# Patient Record
Sex: MALE | Race: WHITE | NOT HISPANIC OR LATINO | Employment: FULL TIME | URBAN - METROPOLITAN AREA
[De-identification: names, ages, dates, MRNs, and addresses within clinical notes are randomized per-mention and may not be internally consistent; named-entity substitution may affect disease eponyms.]

---

## 2024-04-15 ENCOUNTER — TELEPHONE (OUTPATIENT)
Dept: PRIMARY CARE | Facility: CLINIC | Age: 34
End: 2024-04-15
Payer: COMMERCIAL

## 2024-04-15 NOTE — TELEPHONE ENCOUNTER
Benjamin called to see if Dr CURRY would accept him as a new patient. His parents are both patients of Dr CURRY. [phone]

## 2024-04-24 ENCOUNTER — OFFICE VISIT (OUTPATIENT)
Dept: PRIMARY CARE | Facility: CLINIC | Age: 34
End: 2024-04-24
Payer: COMMERCIAL

## 2024-04-24 VITALS
TEMPERATURE: 97.9 F | SYSTOLIC BLOOD PRESSURE: 122 MMHG | WEIGHT: 275 LBS | DIASTOLIC BLOOD PRESSURE: 84 MMHG | HEIGHT: 74 IN | OXYGEN SATURATION: 98 % | BODY MASS INDEX: 35.29 KG/M2 | HEART RATE: 87 BPM

## 2024-04-24 DIAGNOSIS — K40.30 NON-RECURRENT UNILATERAL INGUINAL HERNIA WITH OBSTRUCTION WITHOUT GANGRENE: Primary | ICD-10-CM

## 2024-04-24 PROCEDURE — 99203 OFFICE O/P NEW LOW 30 MIN: CPT | Performed by: FAMILY MEDICINE

## 2024-04-24 PROCEDURE — 1036F TOBACCO NON-USER: CPT | Performed by: FAMILY MEDICINE

## 2024-04-24 ASSESSMENT — PATIENT HEALTH QUESTIONNAIRE - PHQ9
1. LITTLE INTEREST OR PLEASURE IN DOING THINGS: NOT AT ALL
SUM OF ALL RESPONSES TO PHQ9 QUESTIONS 1 AND 2: 0
2. FEELING DOWN, DEPRESSED OR HOPELESS: NOT AT ALL

## 2024-04-24 ASSESSMENT — ENCOUNTER SYMPTOMS
DEPRESSION: 0
OCCASIONAL FEELINGS OF UNSTEADINESS: 0
LOSS OF SENSATION IN FEET: 0

## 2024-04-24 ASSESSMENT — COLUMBIA-SUICIDE SEVERITY RATING SCALE - C-SSRS
6. HAVE YOU EVER DONE ANYTHING, STARTED TO DO ANYTHING, OR PREPARED TO DO ANYTHING TO END YOUR LIFE?: NO
2. HAVE YOU ACTUALLY HAD ANY THOUGHTS OF KILLING YOURSELF?: NO
1. IN THE PAST MONTH, HAVE YOU WISHED YOU WERE DEAD OR WISHED YOU COULD GO TO SLEEP AND NOT WAKE UP?: NO

## 2024-04-24 ASSESSMENT — PAIN SCALES - GENERAL: PAINLEVEL: 7

## 2024-04-24 ASSESSMENT — LIFESTYLE VARIABLES
SKIP TO QUESTIONS 9-10: 1
AUDIT-C TOTAL SCORE: 0
HOW OFTEN DO YOU HAVE A DRINK CONTAINING ALCOHOL: NEVER
HOW OFTEN DO YOU HAVE SIX OR MORE DRINKS ON ONE OCCASION: NEVER
HOW MANY STANDARD DRINKS CONTAINING ALCOHOL DO YOU HAVE ON A TYPICAL DAY: PATIENT DOES NOT DRINK

## 2024-04-24 NOTE — PROGRESS NOTES
"Subjective   Patient ID: Benjamin Mosqueda is a 33 y.o. male who presents for Establish Care and Groin Pain.    Groin Pain     2 mo r sided groin pain with lump.  When he lays down the lump seems to go away itself and uses the restroom scrotal sac and the lump enlarges again  Denies any bowel or bladder obstruction issues        Review of Systems    Objective   /84   Pulse 87   Temp 36.6 °C (97.9 °F) (Temporal)   Ht 1.88 m (6' 2\")   Wt 125 kg (275 lb)   SpO2 98%   BMI 35.31 kg/m²     Physical Exam  Vitals reviewed.   Constitutional:       Appearance: Normal appearance.   Cardiovascular:      Rate and Rhythm: Normal rate and regular rhythm.      Heart sounds: Normal heart sounds. No murmur heard.  Pulmonary:      Breath sounds: Normal breath sounds.   Abdominal:      General: Abdomen is flat. Bowel sounds are normal.      Palpations: Abdomen is soft.      Hernia: A hernia is present. Hernia is present in the right inguinal area (large).   Genitourinary:     Testes:         Right: Tenderness present. Mass or swelling not present. Right testis is descended.   Musculoskeletal:         General: No swelling.   Neurological:      Mental Status: He is alert.         Assessment/Plan   Diagnoses and all orders for this visit:  Non-recurrent unilateral inguinal hernia with obstruction without gangrene  -     Referral to General Surgery; Future      Counseled on signs and symptoms strangulation take your he is to go directly to the emergency department and/or if pain increases  Refer to general surgery       "

## 2024-04-26 ENCOUNTER — OFFICE VISIT (OUTPATIENT)
Dept: SURGERY | Facility: CLINIC | Age: 34
End: 2024-04-26
Payer: COMMERCIAL

## 2024-04-26 ENCOUNTER — LAB (OUTPATIENT)
Dept: LAB | Facility: LAB | Age: 34
End: 2024-04-26
Payer: COMMERCIAL

## 2024-04-26 VITALS
HEART RATE: 85 BPM | SYSTOLIC BLOOD PRESSURE: 144 MMHG | BODY MASS INDEX: 31.95 KG/M2 | OXYGEN SATURATION: 95 % | WEIGHT: 249 LBS | HEIGHT: 74 IN | DIASTOLIC BLOOD PRESSURE: 91 MMHG

## 2024-04-26 DIAGNOSIS — K40.90 NON-RECURRENT UNILATERAL INGUINAL HERNIA WITHOUT OBSTRUCTION OR GANGRENE: ICD-10-CM

## 2024-04-26 DIAGNOSIS — R10.31 RIGHT INGUINAL PAIN: ICD-10-CM

## 2024-04-26 DIAGNOSIS — K40.30 NON-RECURRENT UNILATERAL INGUINAL HERNIA WITH OBSTRUCTION WITHOUT GANGRENE: ICD-10-CM

## 2024-04-26 LAB
ABO GROUP (TYPE) IN BLOOD: NORMAL
ALBUMIN SERPL BCP-MCNC: 4.7 G/DL (ref 3.4–5)
ALP SERPL-CCNC: 80 U/L (ref 33–120)
ALT SERPL W P-5'-P-CCNC: 26 U/L (ref 10–52)
ANION GAP SERPL CALC-SCNC: 18 MMOL/L (ref 10–20)
ANTIBODY SCREEN: NORMAL
AST SERPL W P-5'-P-CCNC: 21 U/L (ref 9–39)
BILIRUB DIRECT SERPL-MCNC: 0.1 MG/DL (ref 0–0.3)
BILIRUB SERPL-MCNC: 0.6 MG/DL (ref 0–1.2)
BUN SERPL-MCNC: 14 MG/DL (ref 6–23)
CALCIUM SERPL-MCNC: 9.4 MG/DL (ref 8.6–10.3)
CHLORIDE SERPL-SCNC: 102 MMOL/L (ref 98–107)
CO2 SERPL-SCNC: 26 MMOL/L (ref 21–32)
CREAT SERPL-MCNC: 0.87 MG/DL (ref 0.5–1.3)
EGFRCR SERPLBLD CKD-EPI 2021: >90 ML/MIN/1.73M*2
ERYTHROCYTE [DISTWIDTH] IN BLOOD BY AUTOMATED COUNT: 12.6 % (ref 11.5–14.5)
GLUCOSE SERPL-MCNC: 85 MG/DL (ref 74–99)
HCT VFR BLD AUTO: 48.1 % (ref 41–52)
HGB BLD-MCNC: 15.4 G/DL (ref 13.5–17.5)
INR PPP: 1 (ref 0.9–1.1)
MAGNESIUM SERPL-MCNC: 2.06 MG/DL (ref 1.6–2.4)
MCH RBC QN AUTO: 29.7 PG (ref 26–34)
MCHC RBC AUTO-ENTMCNC: 32 G/DL (ref 32–36)
MCV RBC AUTO: 93 FL (ref 80–100)
NRBC BLD-RTO: 0 /100 WBCS (ref 0–0)
PHOSPHATE SERPL-MCNC: 2.8 MG/DL (ref 2.5–4.9)
PLATELET # BLD AUTO: 269 X10*3/UL (ref 150–450)
POTASSIUM SERPL-SCNC: 3.8 MMOL/L (ref 3.5–5.3)
PROT SERPL-MCNC: 7.3 G/DL (ref 6.4–8.2)
PROTHROMBIN TIME: 11.1 SECONDS (ref 9.8–12.8)
RBC # BLD AUTO: 5.19 X10*6/UL (ref 4.5–5.9)
RH FACTOR (ANTIGEN D): NORMAL
SODIUM SERPL-SCNC: 142 MMOL/L (ref 136–145)
WBC # BLD AUTO: 5.6 X10*3/UL (ref 4.4–11.3)

## 2024-04-26 PROCEDURE — 86850 RBC ANTIBODY SCREEN: CPT

## 2024-04-26 PROCEDURE — 85027 COMPLETE CBC AUTOMATED: CPT

## 2024-04-26 PROCEDURE — 82248 BILIRUBIN DIRECT: CPT

## 2024-04-26 PROCEDURE — 99204 OFFICE O/P NEW MOD 45 MIN: CPT | Performed by: SURGERY

## 2024-04-26 PROCEDURE — 84100 ASSAY OF PHOSPHORUS: CPT

## 2024-04-26 PROCEDURE — 86901 BLOOD TYPING SEROLOGIC RH(D): CPT

## 2024-04-26 PROCEDURE — 83735 ASSAY OF MAGNESIUM: CPT

## 2024-04-26 PROCEDURE — 36415 COLL VENOUS BLD VENIPUNCTURE: CPT

## 2024-04-26 PROCEDURE — 80053 COMPREHEN METABOLIC PANEL: CPT

## 2024-04-26 PROCEDURE — 85610 PROTHROMBIN TIME: CPT

## 2024-04-26 PROCEDURE — 86900 BLOOD TYPING SEROLOGIC ABO: CPT

## 2024-04-26 NOTE — H&P (VIEW-ONLY)
General Surgery History and Physical    Referring Provider:  MD Jessica Diaz, Alan MO MD     Chief Complaint:  Right inguinal hernia    History of Present Illness:  This is a 33 y.o. male who presents with a few month history of a right inguinal hernia.  He reports that a few months ago he felt a gurgling sensation above his pubic bone.  About a month after that he noticed a bulge in his groin.  Over time, the bulge has increased and has extended down his right scrotum.  He denies any issues on the left side.  He denies any fevers, chills, nausea, or vomiting.    Past Medical History:  Patient denies any    Past Surgical History:  Patient denies any    Medications:  Patient denies any    Allergies:  Demerol    Family History:  Patient denies any known family history    Social History:  Single.  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  Denies tobacco, alcohol, and illicits.       Review of Systems:  A complete 12 point review of systems was performed and is negative except as noted in the history of present illness.      Vital Signs:  There were no vitals filed for this visit.       Physical Exam:  General: No acute distress. Sitting up in bed.   Neuro: Alert and oriented ×3. Follows commands.  Head: Atraumatic  Eyes: Pupils equal reactive to light. Extraocular motions intact.  Ears: Hears normal speaking voice.  Mouth, Nose, Throat: Mucous membranes moist.  Normal dentition.  Neck: Supple. No appreciable masses.  Chest: No crepitus.  No appreciable scars.  Heart: Regular rate and rhythm.  Lung: Clear to auscultation bilaterally.  Vascular: No carotid bruits.  Palpable radial pulses bilaterally.  Abdomen: Soft. Nondistended. Nontender.  Right scrotal hernia.  No obvious left inguinal hernia.  Musculoskeletal: Moves all extremities.  Normal range of motion.  Lymphatic: No palpable lymph nodes.  Skin: No rashes or lesions.  Psychological: Normal affect      Laboratory Values:  None      Imaging:     None      Assessment:  This is a 33 y.o. male who presents with a symptomatic right inguinal/scrotal hernia.  We discussed that since it is symptomatic, I recommend a laparoscopic right inguinal hernia repair.  Fortunately, I do not appreciate anything on the left side.      Plan:  -- We will plan for a laparoscopic right inguinal hernia repair with mesh.  -- We will plan for surgery to be performed as an outpatient.  -- We will obtain preoperative labwork including CBC, RFP, Magnesium, LFTs, INR, and Type and Screen.  -- We will apply Dermabond, so the patient may shower the day after surgery.  No swimming or tub soaks for 2 weeks post-operatively.  -- No heavy lifting for 1 week after surgery.        Roverto Garcia MD  General Surgery  Office: [phone]  Fax:     [phone]  8:30 AM   04/26/24

## 2024-04-26 NOTE — PROGRESS NOTES
General Surgery History and Physical    Referring Provider:  MD Jessica Diaz, Alan MO MD     Chief Complaint:  Right inguinal hernia    History of Present Illness:  This is a 33 y.o. male who presents with a few month history of a right inguinal hernia.  He reports that a few months ago he felt a gurgling sensation above his pubic bone.  About a month after that he noticed a bulge in his groin.  Over time, the bulge has increased and has extended down his right scrotum.  He denies any issues on the left side.  He denies any fevers, chills, nausea, or vomiting.    Past Medical History:  Patient denies any    Past Surgical History:  Patient denies any    Medications:  Patient denies any    Allergies:  Demerol    Family History:  Patient denies any known family history    Social History:  Single.  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  Denies tobacco, alcohol, and illicits.       Review of Systems:  A complete 12 point review of systems was performed and is negative except as noted in the history of present illness.      Vital Signs:  There were no vitals filed for this visit.       Physical Exam:  General: No acute distress. Sitting up in bed.   Neuro: Alert and oriented ×3. Follows commands.  Head: Atraumatic  Eyes: Pupils equal reactive to light. Extraocular motions intact.  Ears: Hears normal speaking voice.  Mouth, Nose, Throat: Mucous membranes moist.  Normal dentition.  Neck: Supple. No appreciable masses.  Chest: No crepitus.  No appreciable scars.  Heart: Regular rate and rhythm.  Lung: Clear to auscultation bilaterally.  Vascular: No carotid bruits.  Palpable radial pulses bilaterally.  Abdomen: Soft. Nondistended. Nontender.  Right scrotal hernia.  No obvious left inguinal hernia.  Musculoskeletal: Moves all extremities.  Normal range of motion.  Lymphatic: No palpable lymph nodes.  Skin: No rashes or lesions.  Psychological: Normal affect      Laboratory Values:  None      Imaging:     None      Assessment:  This is a 33 y.o. male who presents with a symptomatic right inguinal/scrotal hernia.  We discussed that since it is symptomatic, I recommend a laparoscopic right inguinal hernia repair.  Fortunately, I do not appreciate anything on the left side.      Plan:  -- We will plan for a laparoscopic right inguinal hernia repair with mesh.  -- We will plan for surgery to be performed as an outpatient.  -- We will obtain preoperative labwork including CBC, RFP, Magnesium, LFTs, INR, and Type and Screen.  -- We will apply Dermabond, so the patient may shower the day after surgery.  No swimming or tub soaks for 2 weeks post-operatively.  -- No heavy lifting for 1 week after surgery.        Roverto Garcia MD  General Surgery  Office: [phone]  Fax:     [phone]  8:30 AM   04/26/24

## 2024-05-15 ENCOUNTER — ANESTHESIA EVENT (OUTPATIENT)
Dept: OPERATING ROOM | Facility: HOSPITAL | Age: 34
End: 2024-05-15
Payer: COMMERCIAL

## 2024-05-15 ENCOUNTER — TELEPHONE (OUTPATIENT)
Dept: PREADMISSION TESTING | Facility: HOSPITAL | Age: 34
End: 2024-05-15
Payer: COMMERCIAL

## 2024-05-15 NOTE — TELEPHONE ENCOUNTER
SURGERY PRE-OPERATIVE INSTRUCTIONS    *You will receive a phone call the day before your procedure  after 2pm, (or the Friday before your surgery if scheduled on a Monday.) Generally the hospital will be calling you with this information after that time.    *You are not to eat after midnight the night before the surgery. You may have 8oz of a clear liquid up until 2 hours prior to arriving to the hospital. The exception is with medications you were instructed to take day of surgery.    *You may take tylenol for pain/discomfort as needed.     *Stop taking all aspirin products, ibuprofen (motrin/advil), naproxen (aleve/naprosyn) for one week prior to surgery.    *Stop taking all vitamins and supplements one week prior to surgery.     *You should not have alcoholic beverages for 24 hours before surgery.     *You should not smoke 24 hours prior to surgery.     *To help prevent surgical infections bathe/shower with Dial soap the evening before surgery.    *You can wear deodorant but no lotion, powder, or perfume/cologne. You should remove all make-up and nail polish at home.    *If you wear glasses, please bring a case for the glasses with you.    *You will be asked to remove dentures and contacts.     *Please leave all valuables at home.    *You should wear loose, comfortable clothing that will accommodate bandages and/or casts.    *You should notify your doctor of any change in your condition (fever, cold, rash, etc). Surgery may need to be re-scheduled until a time you are in better health.    *A responsible adult is required to accompany you to and from the hospital if you are receiving anesthesia or a sedative. Patients are not permitted to drive for 24 hours after anesthesia.     *You can use the [address] parking if you wish.     *If you have any further questions please call [phone].

## 2024-05-17 ENCOUNTER — ANESTHESIA (OUTPATIENT)
Dept: OPERATING ROOM | Facility: HOSPITAL | Age: 34
End: 2024-05-17
Payer: COMMERCIAL

## 2024-05-17 ENCOUNTER — PHARMACY VISIT (OUTPATIENT)
Dept: PHARMACY | Facility: CLINIC | Age: 34
End: 2024-05-17
Payer: COMMERCIAL

## 2024-05-17 ENCOUNTER — HOSPITAL ENCOUNTER (OUTPATIENT)
Facility: HOSPITAL | Age: 34
Setting detail: OUTPATIENT SURGERY
Discharge: HOME | End: 2024-05-17
Attending: SURGERY | Admitting: SURGERY
Payer: COMMERCIAL

## 2024-05-17 VITALS
OXYGEN SATURATION: 97 % | SYSTOLIC BLOOD PRESSURE: 125 MMHG | WEIGHT: 242.51 LBS | HEIGHT: 74 IN | BODY MASS INDEX: 31.12 KG/M2 | DIASTOLIC BLOOD PRESSURE: 69 MMHG | HEART RATE: 77 BPM | RESPIRATION RATE: 15 BRPM | TEMPERATURE: 98.1 F

## 2024-05-17 DIAGNOSIS — K40.90 NON-RECURRENT UNILATERAL INGUINAL HERNIA WITHOUT OBSTRUCTION OR GANGRENE: Primary | ICD-10-CM

## 2024-05-17 LAB
ABO GROUP (TYPE) IN BLOOD: NORMAL
RH FACTOR (ANTIGEN D): NORMAL

## 2024-05-17 PROCEDURE — 36415 COLL VENOUS BLD VENIPUNCTURE: CPT | Performed by: SURGERY

## 2024-05-17 PROCEDURE — 2500000004 HC RX 250 GENERAL PHARMACY W/ HCPCS (ALT 636 FOR OP/ED): Performed by: ANESTHESIOLOGY

## 2024-05-17 PROCEDURE — 3600000003 HC OR TIME - INITIAL BASE CHARGE - PROCEDURE LEVEL THREE: Performed by: SURGERY

## 2024-05-17 PROCEDURE — 3600000008 HC OR TIME - EACH INCREMENTAL 1 MINUTE - PROCEDURE LEVEL THREE: Performed by: SURGERY

## 2024-05-17 PROCEDURE — RXMED WILLOW AMBULATORY MEDICATION CHARGE

## 2024-05-17 PROCEDURE — 7100000009 HC PHASE TWO TIME - INITIAL BASE CHARGE: Performed by: SURGERY

## 2024-05-17 PROCEDURE — 7100000001 HC RECOVERY ROOM TIME - INITIAL BASE CHARGE: Performed by: SURGERY

## 2024-05-17 PROCEDURE — 7100000010 HC PHASE TWO TIME - EACH INCREMENTAL 1 MINUTE: Performed by: SURGERY

## 2024-05-17 PROCEDURE — C1781 MESH (IMPLANTABLE): HCPCS | Performed by: SURGERY

## 2024-05-17 PROCEDURE — 7100000002 HC RECOVERY ROOM TIME - EACH INCREMENTAL 1 MINUTE: Performed by: SURGERY

## 2024-05-17 PROCEDURE — 2500000001 HC RX 250 WO HCPCS SELF ADMINISTERED DRUGS (ALT 637 FOR MEDICARE OP): Performed by: ANESTHESIOLOGY

## 2024-05-17 PROCEDURE — 2780000003 HC OR 278 NO HCPCS: Performed by: SURGERY

## 2024-05-17 PROCEDURE — 96372 THER/PROPH/DIAG INJ SC/IM: CPT | Performed by: SURGERY

## 2024-05-17 PROCEDURE — 3700000001 HC GENERAL ANESTHESIA TIME - INITIAL BASE CHARGE: Performed by: SURGERY

## 2024-05-17 PROCEDURE — 2500000004 HC RX 250 GENERAL PHARMACY W/ HCPCS (ALT 636 FOR OP/ED): Performed by: NURSE ANESTHETIST, CERTIFIED REGISTERED

## 2024-05-17 PROCEDURE — 2500000001 HC RX 250 WO HCPCS SELF ADMINISTERED DRUGS (ALT 637 FOR MEDICARE OP): Performed by: SURGERY

## 2024-05-17 PROCEDURE — 2720000007 HC OR 272 NO HCPCS: Performed by: SURGERY

## 2024-05-17 PROCEDURE — 2500000004 HC RX 250 GENERAL PHARMACY W/ HCPCS (ALT 636 FOR OP/ED): Performed by: SURGERY

## 2024-05-17 PROCEDURE — A49650 PR LAP,INGUINAL HERNIA REPR,INITIAL: Performed by: NURSE ANESTHETIST, CERTIFIED REGISTERED

## 2024-05-17 PROCEDURE — 49650 LAP ING HERNIA REPAIR INIT: CPT | Performed by: SURGERY

## 2024-05-17 PROCEDURE — A49650 PR LAP,INGUINAL HERNIA REPR,INITIAL: Performed by: STUDENT IN AN ORGANIZED HEALTH CARE EDUCATION/TRAINING PROGRAM

## 2024-05-17 PROCEDURE — 3700000002 HC GENERAL ANESTHESIA TIME - EACH INCREMENTAL 1 MINUTE: Performed by: SURGERY

## 2024-05-17 PROCEDURE — 2500000005 HC RX 250 GENERAL PHARMACY W/O HCPCS: Performed by: NURSE ANESTHETIST, CERTIFIED REGISTERED

## 2024-05-17 DEVICE — 3DMAX LIGHT MESH, RIGHT, LARGE
Type: IMPLANTABLE DEVICE | Site: ABDOMEN | Status: FUNCTIONAL
Brand: 3DMAX LIGHT MESH

## 2024-05-17 RX ORDER — FENTANYL CITRATE 50 UG/ML
INJECTION, SOLUTION INTRAMUSCULAR; INTRAVENOUS AS NEEDED
Status: DISCONTINUED | OUTPATIENT
Start: 2024-05-17 | End: 2024-05-17

## 2024-05-17 RX ORDER — SODIUM CHLORIDE, SODIUM LACTATE, POTASSIUM CHLORIDE, CALCIUM CHLORIDE 600; 310; 30; 20 MG/100ML; MG/100ML; MG/100ML; MG/100ML
100 INJECTION, SOLUTION INTRAVENOUS CONTINUOUS
Status: DISCONTINUED | OUTPATIENT
Start: 2024-05-17 | End: 2024-05-17 | Stop reason: HOSPADM

## 2024-05-17 RX ORDER — ACETAMINOPHEN 325 MG/1
650 TABLET ORAL EVERY 6 HOURS
Qty: 20 TABLET | Refills: 0 | Status: SHIPPED | OUTPATIENT
Start: 2024-05-17 | End: 2024-05-20

## 2024-05-17 RX ORDER — ONDANSETRON 4 MG/1
4 TABLET, ORALLY DISINTEGRATING ORAL EVERY 6 HOURS PRN
Qty: 15 TABLET | Refills: 0 | Status: SHIPPED | OUTPATIENT
Start: 2024-05-17

## 2024-05-17 RX ORDER — KETOROLAC TROMETHAMINE 30 MG/ML
INJECTION, SOLUTION INTRAMUSCULAR; INTRAVENOUS AS NEEDED
Status: DISCONTINUED | OUTPATIENT
Start: 2024-05-17 | End: 2024-05-17

## 2024-05-17 RX ORDER — ENOXAPARIN SODIUM 100 MG/ML
30 INJECTION SUBCUTANEOUS ONCE
Status: COMPLETED | OUTPATIENT
Start: 2024-05-17 | End: 2024-05-17

## 2024-05-17 RX ORDER — ROCURONIUM BROMIDE 10 MG/ML
INJECTION, SOLUTION INTRAVENOUS AS NEEDED
Status: DISCONTINUED | OUTPATIENT
Start: 2024-05-17 | End: 2024-05-17

## 2024-05-17 RX ORDER — CEFAZOLIN 1 G/1
INJECTION, POWDER, FOR SOLUTION INTRAVENOUS AS NEEDED
Status: DISCONTINUED | OUTPATIENT
Start: 2024-05-17 | End: 2024-05-17

## 2024-05-17 RX ORDER — IBUPROFEN 200 MG
200 TABLET ORAL EVERY 6 HOURS
COMMUNITY
End: 2024-05-17 | Stop reason: HOSPADM

## 2024-05-17 RX ORDER — OXYCODONE HYDROCHLORIDE 5 MG/1
5 TABLET ORAL EVERY 4 HOURS PRN
Status: DISCONTINUED | OUTPATIENT
Start: 2024-05-17 | End: 2024-05-17 | Stop reason: HOSPADM

## 2024-05-17 RX ORDER — LIDOCAINE HCL/PF 100 MG/5ML
SYRINGE (ML) INTRAVENOUS AS NEEDED
Status: DISCONTINUED | OUTPATIENT
Start: 2024-05-17 | End: 2024-05-17

## 2024-05-17 RX ORDER — ONDANSETRON HYDROCHLORIDE 2 MG/ML
4 INJECTION, SOLUTION INTRAVENOUS ONCE AS NEEDED
Status: DISCONTINUED | OUTPATIENT
Start: 2024-05-17 | End: 2024-05-17 | Stop reason: HOSPADM

## 2024-05-17 RX ORDER — DROPERIDOL 2.5 MG/ML
0.62 INJECTION, SOLUTION INTRAMUSCULAR; INTRAVENOUS ONCE AS NEEDED
Status: DISCONTINUED | OUTPATIENT
Start: 2024-05-17 | End: 2024-05-17 | Stop reason: HOSPADM

## 2024-05-17 RX ORDER — BUPIVACAINE HYDROCHLORIDE 5 MG/ML
INJECTION, SOLUTION EPIDURAL; INTRACAUDAL AS NEEDED
Status: DISCONTINUED | OUTPATIENT
Start: 2024-05-17 | End: 2024-05-17 | Stop reason: HOSPADM

## 2024-05-17 RX ORDER — PROPOFOL 10 MG/ML
INJECTION, EMULSION INTRAVENOUS AS NEEDED
Status: DISCONTINUED | OUTPATIENT
Start: 2024-05-17 | End: 2024-05-17

## 2024-05-17 RX ORDER — MIDAZOLAM HYDROCHLORIDE 1 MG/ML
INJECTION INTRAMUSCULAR; INTRAVENOUS AS NEEDED
Status: DISCONTINUED | OUTPATIENT
Start: 2024-05-17 | End: 2024-05-17

## 2024-05-17 RX ORDER — IBUPROFEN 600 MG/1
600 TABLET ORAL EVERY 6 HOURS
Qty: 10 TABLET | Refills: 0 | Status: SHIPPED | OUTPATIENT
Start: 2024-05-17 | End: 2024-05-20

## 2024-05-17 RX ORDER — ONDANSETRON HYDROCHLORIDE 2 MG/ML
INJECTION, SOLUTION INTRAVENOUS AS NEEDED
Status: DISCONTINUED | OUTPATIENT
Start: 2024-05-17 | End: 2024-05-17

## 2024-05-17 RX ORDER — OXYCODONE HYDROCHLORIDE 5 MG/1
5 TABLET ORAL EVERY 6 HOURS PRN
Qty: 5 TABLET | Refills: 0 | Status: SHIPPED | OUTPATIENT
Start: 2024-05-17

## 2024-05-17 RX ORDER — ACETAMINOPHEN 325 MG/1
650 TABLET ORAL ONCE
Status: COMPLETED | OUTPATIENT
Start: 2024-05-17 | End: 2024-05-17

## 2024-05-17 RX ORDER — POLYETHYLENE GLYCOL 3350 17 G/17G
17 POWDER, FOR SOLUTION ORAL DAILY
Qty: 510 G | Refills: 0 | Status: SHIPPED | OUTPATIENT
Start: 2024-05-17 | End: 2024-06-16

## 2024-05-17 RX ORDER — GABAPENTIN 300 MG/1
300 CAPSULE ORAL ONCE
Status: COMPLETED | OUTPATIENT
Start: 2024-05-17 | End: 2024-05-17

## 2024-05-17 RX ADMIN — HYDROMORPHONE HYDROCHLORIDE 0.5 MG: 1 INJECTION, SOLUTION INTRAMUSCULAR; INTRAVENOUS; SUBCUTANEOUS at 12:55

## 2024-05-17 RX ADMIN — HYDROMORPHONE HYDROCHLORIDE 0.25 MG: 1 INJECTION, SOLUTION INTRAMUSCULAR; INTRAVENOUS; SUBCUTANEOUS at 13:05

## 2024-05-17 RX ADMIN — GABAPENTIN 300 MG: 300 CAPSULE ORAL at 10:01

## 2024-05-17 RX ADMIN — SUGAMMADEX 100 MG: 100 INJECTION, SOLUTION INTRAVENOUS at 12:29

## 2024-05-17 RX ADMIN — SODIUM CHLORIDE, POTASSIUM CHLORIDE, SODIUM LACTATE AND CALCIUM CHLORIDE 100 ML/HR: 600; 310; 30; 20 INJECTION, SOLUTION INTRAVENOUS at 10:17

## 2024-05-17 RX ADMIN — DEXAMETHASONE SODIUM PHOSPHATE 4 MG: 4 INJECTION INTRA-ARTICULAR; INTRALESIONAL; INTRAMUSCULAR; INTRAVENOUS; SOFT TISSUE at 12:29

## 2024-05-17 RX ADMIN — CEFAZOLIN 2 G: 330 INJECTION, POWDER, FOR SOLUTION INTRAMUSCULAR; INTRAVENOUS at 11:30

## 2024-05-17 RX ADMIN — LIDOCAINE HYDROCHLORIDE 80 MG: 20 INJECTION INTRAVENOUS at 11:24

## 2024-05-17 RX ADMIN — HYDROMORPHONE HYDROCHLORIDE 0.25 MG: 1 INJECTION, SOLUTION INTRAMUSCULAR; INTRAVENOUS; SUBCUTANEOUS at 14:00

## 2024-05-17 RX ADMIN — MIDAZOLAM HYDROCHLORIDE 2 MG: 1 INJECTION, SOLUTION INTRAMUSCULAR; INTRAVENOUS at 11:20

## 2024-05-17 RX ADMIN — FENTANYL CITRATE 100 MCG: 50 INJECTION, SOLUTION INTRAMUSCULAR; INTRAVENOUS at 11:24

## 2024-05-17 RX ADMIN — ROCURONIUM BROMIDE 50 MG: 10 INJECTION, SOLUTION INTRAVENOUS at 11:24

## 2024-05-17 RX ADMIN — OXYCODONE HYDROCHLORIDE 5 MG: 5 TABLET ORAL at 13:31

## 2024-05-17 RX ADMIN — ENOXAPARIN SODIUM 30 MG: 30 INJECTION SUBCUTANEOUS at 10:06

## 2024-05-17 RX ADMIN — FENTANYL CITRATE 100 MCG: 50 INJECTION, SOLUTION INTRAMUSCULAR; INTRAVENOUS at 11:50

## 2024-05-17 RX ADMIN — ONDANSETRON 4 MG: 2 INJECTION, SOLUTION INTRAMUSCULAR; INTRAVENOUS at 12:29

## 2024-05-17 RX ADMIN — PROPOFOL 200 MG: 10 INJECTION, EMULSION INTRAVENOUS at 11:24

## 2024-05-17 RX ADMIN — FENTANYL CITRATE 100 MCG: 50 INJECTION, SOLUTION INTRAMUSCULAR; INTRAVENOUS at 12:30

## 2024-05-17 RX ADMIN — KETOROLAC TROMETHAMINE 30 MG: 30 INJECTION, SOLUTION INTRAMUSCULAR; INTRAVENOUS at 12:29

## 2024-05-17 RX ADMIN — ACETAMINOPHEN 650 MG: 325 TABLET ORAL at 10:00

## 2024-05-17 SDOH — HEALTH STABILITY: MENTAL HEALTH: CURRENT SMOKER: 0

## 2024-05-17 ASSESSMENT — COLUMBIA-SUICIDE SEVERITY RATING SCALE - C-SSRS
1. IN THE PAST MONTH, HAVE YOU WISHED YOU WERE DEAD OR WISHED YOU COULD GO TO SLEEP AND NOT WAKE UP?: NO
6. HAVE YOU EVER DONE ANYTHING, STARTED TO DO ANYTHING, OR PREPARED TO DO ANYTHING TO END YOUR LIFE?: NO
2. HAVE YOU ACTUALLY HAD ANY THOUGHTS OF KILLING YOURSELF?: NO

## 2024-05-17 ASSESSMENT — PAIN - FUNCTIONAL ASSESSMENT
PAIN_FUNCTIONAL_ASSESSMENT: 0-10
PAIN_FUNCTIONAL_ASSESSMENT: 0-10

## 2024-05-17 ASSESSMENT — PAIN SCALES - GENERAL
PAINLEVEL_OUTOF10: 7
PAINLEVEL_OUTOF10: 2
PAINLEVEL_OUTOF10: 6
PAIN_LEVEL: 0
PAINLEVEL_OUTOF10: 4
PAINLEVEL_OUTOF10: 5 - MODERATE PAIN
PAINLEVEL_OUTOF10: 1
PAINLEVEL_OUTOF10: 7

## 2024-05-17 ASSESSMENT — PAIN DESCRIPTION - LOCATION
LOCATION: ABDOMEN

## 2024-05-17 ASSESSMENT — PAIN SCALES - PAIN ASSESSMENT IN ADVANCED DEMENTIA (PAINAD): BODYLANGUAGE: RELAXED

## 2024-05-17 NOTE — ANESTHESIA PROCEDURE NOTES
Airway  Date/Time: 5/17/2024 11:26 AM  Urgency: elective    Airway not difficult    Staffing  Performed: CRNA   Authorized by: SUNITA Huffman    Performed by: SUNITA Huffman  Patient location during procedure: OR    Indications and Patient Condition  Indications for airway management: anesthesia  Spontaneous Ventilation: absent  Sedation level: deep  Preoxygenated: yes  Patient position: sniffing  MILS maintained throughout  Mask difficulty assessment: 1 - vent by mask  Planned trial extubation    Final Airway Details  Final airway type: endotracheal airway      Successful airway: ETT  Cuffed: yes   Successful intubation technique: video laryngoscopy  Facilitating devices/methods: cricoid pressure  Blade size: #4  ETT size (mm): 7.5  Cormack-Lehane Classification: grade I - full view of glottis  Placement verified by: chest auscultation   Cuff volume (mL): 7  Measured from: lips  ETT to lips (cm): 21  Number of attempts at approach: 1  Ventilation between attempts: BVM  Number of other approaches attempted: 0

## 2024-05-17 NOTE — ANESTHESIA PREPROCEDURE EVALUATION
Patient: Benjamin Mosqueda    Procedure Information       Date/Time: 05/17/24 1000    Procedure: LAPAROSCOPIC RIGHT INGUINAL HERNIA REPAIR WITH MESH (Right) - laparoscopic right inguinal hernia repair with mesh.    Location: [facility] / [facility]    Surgeons: Ike Garcia MD            Relevant Problems   Cardiac (within normal limits)      Pulmonary (within normal limits)      Neuro (within normal limits)      GI (within normal limits)      /Renal (within normal limits)      Liver (within normal limits)      Endocrine (within normal limits)      Hematology (within normal limits)      Musculoskeletal (within normal limits)      HEENT (within normal limits)      ID (within normal limits)      Skin (within normal limits)      GYN (within normal limits)      Digestive   (+) Non-recurrent unilateral inguinal hernia without obstruction or gangrene       Clinical information reviewed:   Tobacco  Allergies    Med Hx  Surg Hx   Fam Hx  Soc Hx        NPO Detail:  NPO/Void Status  Date of Last Liquid: 05/16/24  Time of Last Liquid: 2000  Date of Last Solid: 05/16/24  Time of Last Solid: 1330         Physical Exam    Airway  Mallampati: III  TM distance: >3 FB  Neck ROM: full     Cardiovascular - normal exam     Dental        Pulmonary - normal exam     Abdominal - normal exam  Abdomen: soft             Anesthesia Plan    History of general anesthesia?: no  History of complications of general anesthesia?: unknown/emergency    ASA 1     general     The patient is not a current smoker.    intravenous induction   Postoperative administration of opioids is intended.  Trial extubation is planned.  Anesthetic plan and risks discussed with patient.  Use of blood products discussed with patient who.    Plan discussed with CRNA.

## 2024-05-17 NOTE — ANESTHESIA POSTPROCEDURE EVALUATION
Patient: Benjamin Mosqueda    Procedure Summary       Date: 05/17/24 Room / Location: [facility] / [facility]    Anesthesia Start: 1120 Anesthesia Stop: 1248    Procedure: LAPAROSCOPIC RIGHT INGUINAL HERNIA REPAIR WITH MESH (Right) Diagnosis:       Non-recurrent unilateral inguinal hernia without obstruction or gangrene      (Non-recurrent unilateral inguinal hernia without obstruction or gangrene [K40.90])    Surgeons: Ike Garcia MD Responsible Provider: Miguel Angel Azar MD    Anesthesia Type: general ASA Status: 1            Anesthesia Type: general    Vitals Value Taken Time   /68 05/17/24 1350   Temp 36.7 °C (98.1 °F) 05/17/24 1246   Pulse 79 05/17/24 1350   Resp 15 05/17/24 1350   SpO2 97 05/17/24 1435       Anesthesia Post Evaluation    Patient location during evaluation: PACU  Patient participation: complete - patient participated  Level of consciousness: awake and alert  Pain score: 0  Pain management: adequate  Airway patency: patent  Cardiovascular status: hemodynamically stable  Respiratory status: room air  Hydration status: acceptable  Postoperative Nausea and Vomiting: none        No notable events documented.

## 2024-05-17 NOTE — OP NOTE
LAPAROSCOPIC RIGHT INGUINAL HERNIA REPAIR WITH MESH (R)     Operative Date: 05/17/24  Patient's Name: Benjamin Mosqueda  Patient's YOB: 1990  Patient's MRN: [id number]  Patient's Age: 33 y.o.  Operating Room Location: [facility]  Patient's ASA: I  Patient's Estimated Blood Loss: 5 mL        PREOPERATIVE DIAGNOSIS:   Right inguinal/scrotal inguinal hernia.         POSTOPERATIVE DIAGNOSIS:   Right inguinal/scrotal inguinal hernia.         OPERATION/PROCEDURE:   Laparoscopic right inguinal hernia repair.         SURGEON:   Ike Garcia MD.         ASSISTANT:   Scrub assist.           INDICATIONS:   This is a 33 y.o. male who presented with a right inguinal/scrotal inguinal hernia.  It is been present for a while, but it seemed to be growing over time.  It was now quite symptomatic and he family wish to proceed with surgery.  We discussed that I recommended a laparoscopic repair.        OPERATION:   The reasons for, benefits to, and risks of surgery were discussed with the patient.  The risks included, but not limited to, bleeding, infection, recurrence, and mesh infection.  The patient appeared to understand and consented for surgery.  He was therefore brought back to the operating room and placed on the operating room table in the supine position.  His abdomen was prepped and draped in a standard fashion.       We started with a 12 mm incision just inferior into the left of the umbilicus.  After dissecting down to the anterior sheath, we opened it transversely 12 mm.  We then bluntly retracted the left rectus muscle to the left.  We then used our balloon dissector into the space of Retzius and insufflated under direct visualization.  We then deflated our dissecting balloon and replaced it with our 12 mm balloon-tipped Nichole port.  We then insufflated the space of Retzius to pressure.  We then placed our two 5 mm working ports in the standard location between the pubic  bone and the umbilicus.  We then began dissecting out the right myopectineal orifice.  We quickly came across the right inguinal hernia sac.  We were able to fully reduce this.  We then completely dissected out the right myopectineal orifice.  We then used our mesh.  We used a Bard 3DMax Light right-sided large piece of mesh.  It fit easily over the right myopectineal orifice.  We then tacked into place using an AbsorbaTack.  We placed 1 tack in pubic bone, 1 tack in Klaus's ligament, 1 tack laterally by the ASIS, 1 tack anteriorly lateral to  the epigastric vessels, and 1 tack anteriorly medial to the  epigastric vessels.  We then inspected the abdomen with a 5 mm port placed in the upper abdomen.  We saw that the mesh was flat against the right myopectineal orifice and there were no signs of peritoneal injury.  We then instilled dilute Marcaine for pain  control.  We slowly desufflated the space ensuring that the mesh  stayed flat against the myopectineal orifice.  We then removed our  ports and closed the fascia of our 12 mm port site with interrupted  figure-of-eight 0 Vicryl sutures.  We closed the skin of all of our port sites with subcuticular 4-0 Vicryl suture.  Dermabond was applied over top.         DISPOSITION:   The patient tolerated the procedure well.  There were no immediate complications.  He will be brought to the postanesthesia care unit. From there, he will be discharged home with outpatient followup with me.      I was present and scrubbed in for the entire procedure.      CPT Code:  13773       Operating Room Staff:  Anesthesiologist: Miguel Angel Azar MD  CRNA: NAVIN HuffmanCRNA  Circulator: Ayo Mcclellan RN  Relief Circulator: Elvia Silva RN  Relief Scrub: Elvia Silva RN  Scrub Person: MARIO Doherty MD  General Surgery  Office: [phone]  Fax:     [phone]  12:56 PM  05/17/24

## 2024-05-17 NOTE — DISCHARGE INSTRUCTIONS
PATIENT INSTRUCTIONS  Laparoscopic / Robotic Inguinal Hernia Repair    FOLLOW-UP: Please call Dr. Garcia's office at [phone] after being discharged to make a follow up appointment in 2-3 weeks. Call your physician immediately if you have any fevers greater than 103 degrees Fahrenheit, drainage from your wound that is not clear or looks infected, persistent bleeding, increasing abdominal pain,  or persistent nausea/vomiting.     WOUND CARE INSTRUCTIONS: Incisions are closed with absorbable sutures and covered with glue. Glue will fall off in about 2 weeks. If the wound becomes bright red and painful or starts to drain infected material that is not clear, please contact your physician immediately. If the wound is mildly pink and has a thick firm ridge underneath it, this is normal and is referred to as a healing ridge. This will resolve over the next 4-6 weeks. You are welcome to shower the day after surgery. Wash abdomen with warm, soapy water using your hand or gentle wash cloth. Pat dry. Do not submerge incisions in a bath tub or swimming pool for 2 weeks following surgery.    DIET: You may eat any foods that you can tolerate. We recommend following a bland, easily digestible diet for the first few days after surgery until your appetite improves. It is a good idea to eat a high fiber diet, and take in plenty of fluids to prevent constipation. Take Miralax daily if experiencing constipation after surgery until stools are a pudding like consistency and easy to pass.     ACTIVITY: You are encouraged to cough and take deep breaths or use the incentive spirometry that was provided. This will help prevent respiratory complications and low grade fevers post-operatively. You may want to hug a pillow when coughing and sneezing to add additional support to the surgical area which will decrease pain during these times. You should not lift more than 10 pounds for 1 week after surgery as it could put you at increased risk  for a post-operative hernia recurrence. We encourage frequent ambulation and getting out of bed as much as possible while you recover to improve your recovery process. Avoid strenuous activity for 4 weeks, which includes exercise that increases the heart rate, breathing rate, or makes you break a sweat.   At one week, please ease back into normal activity.    MEDICATIONS: Plan to take Tylenol and Ibuprofen (if your physician approves) every 6 hours scheduled for the first few days after surgery. After a few days, change taking these medications to every 6 hours as needed. You will be provided a short course of a narcotic medication to take for breakthrough pain as needed. You should not drive, make important decisions, or operate machinery when taking narcotic pain medication.    QUESTIONS: Please feel free to call Dr. Garcia's office for any questions or concerns following surgery. Our office number is [phone].     Moderate edema

## 2024-06-07 ENCOUNTER — APPOINTMENT (OUTPATIENT)
Dept: SURGERY | Facility: CLINIC | Age: 34
End: 2024-06-07
Payer: COMMERCIAL

## 2024-06-12 ENCOUNTER — APPOINTMENT (OUTPATIENT)
Dept: SURGERY | Facility: CLINIC | Age: 34
End: 2024-06-12
Payer: COMMERCIAL

## (undated) DEVICE — TISSUE ADHESIVE, PREMIERPRO EXOFIN, PRECISION PEN HV, 1.0ML

## (undated) DEVICE — DRAPE PACK, LAPAROSCOPIC CHOLECYSTECTOMY, CUSTOM, GEAUGA

## (undated) DEVICE — ABSORBATACK, 5MM WITH 30 ABSORBABLE TACKS

## (undated) DEVICE — ACCESS SYS, KII SHIELDED BLADED, Z-THREAD, 5X100CM

## (undated) DEVICE — SLEEVE, KII, Z-THREAD, 5X100CM

## (undated) DEVICE — SUTURE, VICRYL, 4-0, 18 IN, PS2, UNDYED

## (undated) DEVICE — APPLICATOR, CHLORAPREP, W/ORANGE TINT, 26ML

## (undated) DEVICE — SUTURE, VICRYL, 0, 27 IN, UR-6, VIOLET

## (undated) DEVICE — TUBE SET, PNEUMOCLEAR, SMOKE EVACU, HIGH-FLOW

## (undated) DEVICE — PAD, GROUNDING, ELECTROSURGICAL, W/9 FT CABLE, POLYHESIVE II, ADULT, LF

## (undated) DEVICE — SYRINGE, 60 CC, LUER LOCK, MONOJECT, W/O CAP, LF

## (undated) DEVICE — COVER HANDLE LIGHT, STERIS, BLUE, STERILE

## (undated) DEVICE — ACCESS SYSTEM, KII DISSECTING BALLOON, OVAL, STAND ALONE

## (undated) DEVICE — NEEDLE, SAFETY, 21 G X 1.5 IN

## (undated) DEVICE — CAUTERY, PENCIL, PUSH BUTTON, SMOKE EVAC, 70MM

## (undated) DEVICE — CARE KIT, LAPAROSCOPIC, ADVANCED

## (undated) DEVICE — SYRINGE, 20 CC, LUER LOCK

## (undated) DEVICE — TROCAR SYSTEM, BALLOON, KII GELPORT, 12 X 100MM

## (undated) DEVICE — ELECTRODE, ELECTROSURGICAL, BLADE, INSULATED, ENT/IMA, STERILE

## (undated) DEVICE — NEEDLE, ARTERIAL, 18G X 1.5 IN, AMC/4

## (undated) DEVICE — SYRINGE, 10 CC, LUER LOCK